# Patient Record
Sex: FEMALE | Race: WHITE | NOT HISPANIC OR LATINO | Employment: FULL TIME | URBAN - METROPOLITAN AREA
[De-identification: names, ages, dates, MRNs, and addresses within clinical notes are randomized per-mention and may not be internally consistent; named-entity substitution may affect disease eponyms.]

---

## 2023-11-22 ENCOUNTER — TELEPHONE (OUTPATIENT)
Dept: INTERNAL MEDICINE | Facility: CLINIC | Age: 58
End: 2023-11-22
Payer: COMMERCIAL

## 2023-11-22 NOTE — TELEPHONE ENCOUNTER
----- Message from Tyra Jacques LPN sent at 11/21/2023  3:47 PM CST -----  Regarding: ADELAIDE Pacheco 11/30/23 @1:00p  Are there any outstanding tasks in the chart? New patient     Is there any documentation of tasks? no    Has patient seen another physician, been to the ER, U[facility], or admitted to hospital since last visit?    Has the patient done blood work or imaging since last visit?

## 2024-09-16 ENCOUNTER — TELEPHONE (OUTPATIENT)
Dept: INTERNAL MEDICINE | Facility: CLINIC | Age: 59
End: 2024-09-16
Payer: COMMERCIAL

## 2024-09-16 NOTE — TELEPHONE ENCOUNTER
----- Message from Scar Vogt sent at 9/16/2024 11:28 AM CDT -----  .Type:  Patient Returning Call    Who Called:pt   Who Left Message for Patient:  Does the patient know what this is regarding?:needs a medical release form to be signed. Can one be sent to the pt? Email [email or web address]   Would the patient rather a call back or a response via [facility]? Call back   Best Call Back Number:[phone]  Additional Information:

## 2024-09-23 ENCOUNTER — TELEPHONE (OUTPATIENT)
Dept: INTERNAL MEDICINE | Facility: CLINIC | Age: 59
End: 2024-09-23

## 2024-09-23 ENCOUNTER — LAB VISIT (OUTPATIENT)
Dept: LAB | Facility: HOSPITAL | Age: 59
End: 2024-09-23
Attending: INTERNAL MEDICINE
Payer: COMMERCIAL

## 2024-09-23 ENCOUNTER — OFFICE VISIT (OUTPATIENT)
Dept: INTERNAL MEDICINE | Facility: CLINIC | Age: 59
End: 2024-09-23
Payer: COMMERCIAL

## 2024-09-23 VITALS
HEART RATE: 82 BPM | DIASTOLIC BLOOD PRESSURE: 62 MMHG | OXYGEN SATURATION: 98 % | HEIGHT: 62 IN | SYSTOLIC BLOOD PRESSURE: 94 MMHG | BODY MASS INDEX: 20.24 KG/M2 | WEIGHT: 110 LBS

## 2024-09-23 DIAGNOSIS — E03.9 ACQUIRED HYPOTHYROIDISM: ICD-10-CM

## 2024-09-23 DIAGNOSIS — Z76.89 ESTABLISHING CARE WITH NEW DOCTOR, ENCOUNTER FOR: Primary | ICD-10-CM

## 2024-09-23 DIAGNOSIS — Z12.11 SCREEN FOR COLON CANCER: ICD-10-CM

## 2024-09-23 DIAGNOSIS — Z12.31 BREAST CANCER SCREENING BY MAMMOGRAM: ICD-10-CM

## 2024-09-23 DIAGNOSIS — I10 PRIMARY HYPERTENSION: ICD-10-CM

## 2024-09-23 DIAGNOSIS — E34.9 HORMONE IMBALANCE: ICD-10-CM

## 2024-09-23 DIAGNOSIS — F32.A DEPRESSION, UNSPECIFIED DEPRESSION TYPE: ICD-10-CM

## 2024-09-23 LAB
T3FREE SERPL-MCNC: 2.85 PG/ML (ref 1.58–3.91)
T4 FREE SERPL-MCNC: 0.92 NG/DL (ref 0.7–1.48)
TSH SERPL-ACNC: 3.26 UIU/ML (ref 0.35–4.94)

## 2024-09-23 PROCEDURE — 3008F BODY MASS INDEX DOCD: CPT | Mod: CPTII,,, | Performed by: INTERNAL MEDICINE

## 2024-09-23 PROCEDURE — 84439 ASSAY OF FREE THYROXINE: CPT

## 2024-09-23 PROCEDURE — 3078F DIAST BP <80 MM HG: CPT | Mod: CPTII,,, | Performed by: INTERNAL MEDICINE

## 2024-09-23 PROCEDURE — 1159F MED LIST DOCD IN RCRD: CPT | Mod: CPTII,,, | Performed by: INTERNAL MEDICINE

## 2024-09-23 PROCEDURE — 84481 FREE ASSAY (FT-3): CPT

## 2024-09-23 PROCEDURE — 1160F RVW MEDS BY RX/DR IN RCRD: CPT | Mod: CPTII,,, | Performed by: INTERNAL MEDICINE

## 2024-09-23 PROCEDURE — 36415 COLL VENOUS BLD VENIPUNCTURE: CPT

## 2024-09-23 PROCEDURE — 4010F ACE/ARB THERAPY RXD/TAKEN: CPT | Mod: CPTII,,, | Performed by: INTERNAL MEDICINE

## 2024-09-23 PROCEDURE — 99204 OFFICE O/P NEW MOD 45 MIN: CPT | Mod: ,,, | Performed by: INTERNAL MEDICINE

## 2024-09-23 PROCEDURE — 84443 ASSAY THYROID STIM HORMONE: CPT

## 2024-09-23 PROCEDURE — 3074F SYST BP LT 130 MM HG: CPT | Mod: CPTII,,, | Performed by: INTERNAL MEDICINE

## 2024-09-23 RX ORDER — LEVOTHYROXINE SODIUM 50 UG/1
50 TABLET ORAL EVERY MORNING
COMMUNITY
End: 2024-09-23 | Stop reason: SDUPTHER

## 2024-09-23 RX ORDER — LEVOTHYROXINE SODIUM 50 UG/1
50 TABLET ORAL EVERY MORNING
Qty: 30 TABLET | Refills: 5 | Status: SHIPPED | OUTPATIENT
Start: 2024-09-23

## 2024-09-23 RX ORDER — CHOLECALCIFEROL (VITAMIN D3) 25 MCG
1000 TABLET ORAL DAILY
COMMUNITY

## 2024-09-23 RX ORDER — FLUOXETINE 20 MG/1
20 TABLET ORAL DAILY
Qty: 90 TABLET | Refills: 5 | Status: SHIPPED | OUTPATIENT
Start: 2024-09-23

## 2024-09-23 RX ORDER — ENALAPRIL MALEATE 5 MG/1
5 TABLET ORAL DAILY
COMMUNITY
End: 2024-09-23 | Stop reason: SDUPTHER

## 2024-09-23 RX ORDER — FLUOXETINE 20 MG/1
20 TABLET ORAL DAILY
COMMUNITY
Start: 2024-09-05 | End: 2024-09-23 | Stop reason: SDUPTHER

## 2024-09-23 RX ORDER — AMLODIPINE BESYLATE 2.5 MG/1
2.5 TABLET ORAL DAILY
Qty: 30 TABLET | Refills: 5 | Status: SHIPPED | OUTPATIENT
Start: 2024-09-23

## 2024-09-23 RX ORDER — VITAMIN B COMPLEX
CAPSULE ORAL DAILY
COMMUNITY

## 2024-09-23 RX ORDER — ENALAPRIL MALEATE 5 MG/1
5 TABLET ORAL DAILY
Qty: 90 TABLET | Refills: 5 | Status: SHIPPED | OUTPATIENT
Start: 2024-09-23

## 2024-09-23 RX ORDER — LIOTHYRONINE SODIUM 5 UG/1
5 TABLET ORAL DAILY
Qty: 30 TABLET | Refills: 5 | Status: SHIPPED | OUTPATIENT
Start: 2024-09-23

## 2024-09-23 RX ORDER — AMLODIPINE BESYLATE 2.5 MG/1
2.5 TABLET ORAL DAILY
COMMUNITY
End: 2024-09-23 | Stop reason: SDUPTHER

## 2024-09-23 RX ORDER — LIOTHYRONINE SODIUM 5 UG/1
5 TABLET ORAL DAILY
COMMUNITY
Start: 2024-02-28 | End: 2024-09-23 | Stop reason: SDUPTHER

## 2024-09-23 NOTE — ASSESSMENT & PLAN NOTE
Continue levothyroxine 50 mcg p.o. daily and liothyronine 5 mcg p.o. daily  Take medicine on an empty stomach with water (no other medications or beverages). Wait 30 minutes to eat or drink.  Report any symptoms of thinning hair, breaking nails, fatigue, weight gain or loss, palpitations.

## 2024-09-23 NOTE — PROGRESS NOTES
Subjective:      Patient ID: Erica Marc is a 58 y.o. female.    Chief Complaint: Establish Care    Erica is a 58-year-old female who is here today to establish care.  Her medical comorbidities are significant for hypertension, depression, hypothyroidism which seemed to be all currently well controlled.    TSH and free T3 and free T4 will be ordered.  Dose will be titrated if needed.    Only complaint today is some hair fall.  Discussed OTC minoxidil for local application    CRS: Due, refer to Gastro   Gyn: Refer to Dr. Limon    The patient's Health Maintenance was reviewed and the following appears to be due at this time:   Health Maintenance Due   Topic Date Due    Hepatitis C Screening  Never done    Cervical Cancer Screening  Never done    Lipid Panel  Never done    HIV Screening  Never done    TETANUS VACCINE  Never done    Colorectal Cancer Screening  Never done    Shingles Vaccine (1 of 2) Never done    Mammogram  09/15/2023    Influenza Vaccine (1) 2024    COVID-19 Vaccine ( season) Never done        Past Medical History:  Past Medical History:   Diagnosis Date    Depression     HTN (hypertension)     Hypothyroidism, unspecified      Past Surgical History:   Procedure Laterality Date     SECTION      3    TONSILLECTOMY      TUBAL LIGATION       Review of patient's allergies indicates:  No Known Allergies  Social History     Socioeconomic History    Marital status: Significant Other   Tobacco Use    Smoking status: Never    Smokeless tobacco: Never   Substance and Sexual Activity    Alcohol use: Never    Drug use: Never    Sexual activity: Yes     Partners: Male     Birth control/protection: See Surgical Hx, Other-see comments     Family History   Problem Relation Name Age of Onset    Arthritis Mother Mom     Hypertension Mother Mom     Cancer Father Dad     Depression Father Dad     Depression Brother Barry     Depression Sister Mara        Review of Systems    A comprehensive review  "of systems was performed and is negative except for that stated above  Objective:   BP 94/62 (BP Location: Left arm, Patient Position: Sitting, BP Method: Small (Manual))   Pulse 82   Ht 5' 2" (1.575 m)   Wt 49.9 kg (110 lb)   SpO2 98%   BMI 20.12 kg/m²     Physical Exam  Constitutional:       Appearance: Normal appearance.   HENT:      Head: Normocephalic and atraumatic.      Nose: Nose normal.      Mouth/Throat:      Mouth: Mucous membranes are moist.      Pharynx: Oropharynx is clear.   Eyes:      Extraocular Movements: Extraocular movements intact.      Pupils: Pupils are equal, round, and reactive to light.   Cardiovascular:      Rate and Rhythm: Normal rate and regular rhythm.      Pulses: Normal pulses.   Pulmonary:      Effort: Pulmonary effort is normal.      Breath sounds: Normal breath sounds.   Abdominal:      General: Bowel sounds are normal.      Palpations: Abdomen is soft.   Musculoskeletal:         General: Normal range of motion.      Cervical back: Normal range of motion and neck supple.   Skin:     General: Skin is warm.   Neurological:      General: No focal deficit present.      Mental Status: She is alert and oriented to person, place, and time. Mental status is at baseline.   Psychiatric:         Mood and Affect: Mood normal.       Assessment/ Plan:   1. Establishing care with new doctor, encounter for  Overview:  -labs are ordered for TSH, free T3 and free T4   -patient is advised on importance of watching her carbohydrate intake and saturated fat intake, making the right nutritional choices and exercising on a regular basis  -needs colonoscopy and Pap smear, sending a referral to Gastroenterology and Gynecology   -also ordering mammogram      2. Acquired hypothyroidism  Assessment & Plan:    Continue levothyroxine 50 mcg p.o. daily and liothyronine 5 mcg p.o. daily  Take medicine on an empty stomach with water (no other medications or beverages). Wait 30 minutes to eat or " drink.  Report any symptoms of thinning hair, breaking nails, fatigue, weight gain or loss, palpitations.      Orders:  -     TSH; Future; Expected date: 09/23/2024  -     T4, Free; Future; Expected date: 09/23/2024  -     T3, Free ([facility]); Future; Expected date: 09/23/2024    3. Primary hypertension  Overview:  Well controlled.   Continue enalapril 5 mg p.o. daily and Norvasc 2.5 mg p.o. daily  Low Sodium Diet (DASH Diet - Less than 2 grams of sodium per day).  Monitor blood pressure daily and log. Report consistent numbers greater than 140/90.  Maintain healthy weight with goal BMI <30. Exercise 30 minutes per day, 5 days per week.             4. Depression, unspecified depression type  Overview:  Continue Prozac 20 mg p.o. daily, staying controlled    Exercise daily. Get sunlight daily.  Practice positive phrases and repeat throughout the day, along with yoga and relaxation techniques.  Establish good social support, make changes to reduce stress.      5. Screen for colon cancer  -     Ambulatory referral/consult to Gastroenterology; Future; Expected date: 09/23/2024    6. Hormone imbalance  -     Ambulatory referral/consult to Obstetrics / Gynecology; Future; Expected date: 09/23/2024    7. Breast cancer screening by mammogram  -     Mammo Digital Screening Bilat; Future; Expected date: 09/23/2024    Other orders  -     FLUoxetine 20 MG tablet; Take 1 tablet (20 mg total) by mouth once daily.  Dispense: 90 tablet; Refill: 5  -     liothyronine (CYTOMEL) 5 MCG Tab; Take 1 tablet (5 mcg total) by mouth once daily.  Dispense: 30 tablet; Refill: 5  -     levothyroxine (SYNTHROID) 50 MCG tablet; Take 1 tablet (50 mcg total) by mouth every morning.  Dispense: 30 tablet; Refill: 5  -     enalapril (VASOTEC) 5 MG tablet; Take 1 tablet (5 mg total) by mouth once daily.  Dispense: 90 tablet; Refill: 5  -     amLODIPine (NORVASC) 2.5 MG tablet; Take 1 tablet (2.5 mg total) by mouth once daily.  Dispense: 30 tablet; Refill:  5

## 2024-09-23 NOTE — TELEPHONE ENCOUNTER
----- Message from Mame Gordon MD sent at 9/23/2024  3:23 PM CDT -----  Please contact the patient and let them know that their results were fine and do not require any change in treatment.

## 2024-09-24 ENCOUNTER — TELEPHONE (OUTPATIENT)
Dept: INTERNAL MEDICINE | Facility: CLINIC | Age: 59
End: 2024-09-24
Payer: COMMERCIAL

## 2024-09-24 NOTE — TELEPHONE ENCOUNTER
----- Message from [facility] sent at 9/24/2024 11:45 AM CDT -----  .Caller is requesting to schedule their Lab appointment prior to annual appointment.    Name of Caller: pt    Preferred Date and Time of Labs: now    Date of EPP Appointment: dr's request    Where would they like the lab performed? [facility]      Would the patient rather a call back or a response via [facility]?  lea    Best Call Back Number: [phone]      Additional Information: pt wants to know when can she go and do the labs that Dr. SCHWARTZ requested her to do please advise thanks also put labs in the system

## 2024-09-24 NOTE — TELEPHONE ENCOUNTER
Spoke with patient and let her know labs will be ordered before her wellness in March 25. She can get them done a week before.

## 2024-09-27 ENCOUNTER — HOSPITAL ENCOUNTER (OUTPATIENT)
Dept: RADIOLOGY | Facility: HOSPITAL | Age: 59
Discharge: HOME | End: 2024-09-27
Attending: INTERNAL MEDICINE
Payer: COMMERCIAL

## 2024-09-27 DIAGNOSIS — Z12.31 BREAST CANCER SCREENING BY MAMMOGRAM: ICD-10-CM

## 2024-09-27 PROCEDURE — 77063 BREAST TOMOSYNTHESIS BI: CPT | Mod: 26,,, | Performed by: RADIOLOGY

## 2024-09-27 PROCEDURE — 77067 SCR MAMMO BI INCL CAD: CPT | Mod: TC

## 2024-09-27 PROCEDURE — 77067 SCR MAMMO BI INCL CAD: CPT | Mod: 26,,, | Performed by: RADIOLOGY

## 2024-10-08 ENCOUNTER — TELEPHONE (OUTPATIENT)
Dept: INTERNAL MEDICINE | Facility: CLINIC | Age: 59
End: 2024-10-08
Payer: COMMERCIAL

## 2024-10-08 DIAGNOSIS — I10 HYPERTENSION, UNSPECIFIED TYPE: ICD-10-CM

## 2024-10-08 DIAGNOSIS — F32.A DEPRESSION, UNSPECIFIED DEPRESSION TYPE: Primary | ICD-10-CM

## 2024-10-08 DIAGNOSIS — E03.9 ACQUIRED HYPOTHYROIDISM: ICD-10-CM

## 2024-10-08 NOTE — TELEPHONE ENCOUNTER
----- Message from Jordan sent at 10/8/2024 11:14 AM CDT -----  .Who Called: Erica Marc    What order is the patient requesting: labs    When does the expect the orders to be performed?         Preferred Method of Contact: Phone Call  Patient's Preferred Phone Number on File: [phone]   Best Call Back Number, if different:  Additional Information:  pt called wanting to speak with nurse about lab orders requested by her insurance that needs to be done as soon as possible

## 2024-10-08 NOTE — TELEPHONE ENCOUNTER
Pt is requesting a CBC, CMP, and Lipid panel per her insurance established care 09/24/2024 upcoming appt on 03/24/2025. Please advise

## 2024-10-08 NOTE — TELEPHONE ENCOUNTER
Okay to add fasting CBC, CMP and lipid profile.  Diagnosis: hypertension, hypothyroidism, and depression

## 2024-10-09 ENCOUNTER — LAB VISIT (OUTPATIENT)
Dept: LAB | Facility: HOSPITAL | Age: 59
End: 2024-10-09
Attending: INTERNAL MEDICINE
Payer: COMMERCIAL

## 2024-10-09 DIAGNOSIS — F32.A DEPRESSION, UNSPECIFIED DEPRESSION TYPE: ICD-10-CM

## 2024-10-09 DIAGNOSIS — E03.9 ACQUIRED HYPOTHYROIDISM: ICD-10-CM

## 2024-10-09 DIAGNOSIS — I10 HYPERTENSION, UNSPECIFIED TYPE: ICD-10-CM

## 2024-10-09 LAB
ALBUMIN SERPL-MCNC: 3.9 G/DL (ref 3.5–5)
ALBUMIN/GLOB SERPL: 2.1 RATIO (ref 1.1–2)
ALP SERPL-CCNC: 58 UNIT/L (ref 40–150)
ALT SERPL-CCNC: 23 UNIT/L (ref 0–55)
ANION GAP SERPL CALC-SCNC: 10 MEQ/L
AST SERPL-CCNC: 18 UNIT/L (ref 5–34)
BASOPHILS # BLD AUTO: 0.04 X10(3)/MCL
BASOPHILS NFR BLD AUTO: 0.7 %
BILIRUB SERPL-MCNC: 0.5 MG/DL
BUN SERPL-MCNC: 27.2 MG/DL (ref 9.8–20.1)
CALCIUM SERPL-MCNC: 9.1 MG/DL (ref 8.4–10.2)
CHLORIDE SERPL-SCNC: 110 MMOL/L (ref 98–107)
CHOLEST SERPL-MCNC: 199 MG/DL
CHOLEST/HDLC SERPL: 3 {RATIO} (ref 0–5)
CO2 SERPL-SCNC: 24 MMOL/L (ref 22–29)
CREAT SERPL-MCNC: 0.81 MG/DL (ref 0.55–1.02)
CREAT/UREA NIT SERPL: 34
EOSINOPHIL # BLD AUTO: 0.1 X10(3)/MCL (ref 0–0.9)
EOSINOPHIL NFR BLD AUTO: 1.7 %
ERYTHROCYTE [DISTWIDTH] IN BLOOD BY AUTOMATED COUNT: 14.4 % (ref 11.5–17)
GFR SERPLBLD CREATININE-BSD FMLA CKD-EPI: >60 ML/MIN/1.73/M2
GLOBULIN SER-MCNC: 1.9 GM/DL (ref 2.4–3.5)
GLUCOSE SERPL-MCNC: 85 MG/DL (ref 74–100)
HCT VFR BLD AUTO: 37.4 % (ref 37–47)
HDLC SERPL-MCNC: 64 MG/DL (ref 35–60)
HGB BLD-MCNC: 12.3 G/DL (ref 12–16)
IMM GRANULOCYTES # BLD AUTO: 0.03 X10(3)/MCL (ref 0–0.04)
IMM GRANULOCYTES NFR BLD AUTO: 0.5 %
LDLC SERPL CALC-MCNC: 121 MG/DL (ref 50–140)
LYMPHOCYTES # BLD AUTO: 2.02 X10(3)/MCL (ref 0.6–4.6)
LYMPHOCYTES NFR BLD AUTO: 34.9 %
MCH RBC QN AUTO: 29.7 PG (ref 27–31)
MCHC RBC AUTO-ENTMCNC: 32.9 G/DL (ref 33–36)
MCV RBC AUTO: 90.3 FL (ref 80–94)
MONOCYTES # BLD AUTO: 0.47 X10(3)/MCL (ref 0.1–1.3)
MONOCYTES NFR BLD AUTO: 8.1 %
NEUTROPHILS # BLD AUTO: 3.13 X10(3)/MCL (ref 2.1–9.2)
NEUTROPHILS NFR BLD AUTO: 54.1 %
NRBC BLD AUTO-RTO: 0 %
PLATELET # BLD AUTO: 275 X10(3)/MCL (ref 130–400)
PMV BLD AUTO: 9.7 FL (ref 7.4–10.4)
POTASSIUM SERPL-SCNC: 4.1 MMOL/L (ref 3.5–5.1)
PROT SERPL-MCNC: 5.8 GM/DL (ref 6.4–8.3)
RBC # BLD AUTO: 4.14 X10(6)/MCL (ref 4.2–5.4)
SODIUM SERPL-SCNC: 144 MMOL/L (ref 136–145)
TRIGL SERPL-MCNC: 71 MG/DL (ref 37–140)
VLDLC SERPL CALC-MCNC: 14 MG/DL
WBC # BLD AUTO: 5.79 X10(3)/MCL (ref 4.5–11.5)

## 2024-10-09 PROCEDURE — 85025 COMPLETE CBC W/AUTO DIFF WBC: CPT

## 2024-10-09 PROCEDURE — 36415 COLL VENOUS BLD VENIPUNCTURE: CPT

## 2024-10-09 PROCEDURE — 80061 LIPID PANEL: CPT

## 2024-10-09 PROCEDURE — 80053 COMPREHEN METABOLIC PANEL: CPT

## 2024-12-17 DIAGNOSIS — I10 HYPERTENSION, UNSPECIFIED TYPE: Primary | ICD-10-CM

## 2024-12-18 RX ORDER — ENALAPRIL MALEATE 5 MG/1
5 TABLET ORAL DAILY
Qty: 90 TABLET | Refills: 5 | Status: SHIPPED | OUTPATIENT
Start: 2024-12-18

## 2025-01-06 ENCOUNTER — PATIENT OUTREACH (OUTPATIENT)
Facility: CLINIC | Age: 60
End: 2025-01-06
Payer: COMMERCIAL

## 2025-01-06 LAB — CRC RECOMMENDATION EXT: NORMAL

## 2025-01-06 NOTE — PROGRESS NOTES
Health Maintenance Topic(s) Outreach Outcomes & Actions Taken:    Colorectal Cancer Screening - Outreach Outcomes & Actions Taken  : External Records Uploaded, Care Team Updated, & History Updated if Applicable     Additional Notes:  Colonoscopy 1/6/25

## 2025-03-12 DIAGNOSIS — Z00.00 WELLNESS EXAMINATION: ICD-10-CM

## 2025-03-12 DIAGNOSIS — I10 HYPERTENSION, UNSPECIFIED TYPE: ICD-10-CM

## 2025-03-12 DIAGNOSIS — E03.9 ACQUIRED HYPOTHYROIDISM: ICD-10-CM

## 2025-03-12 DIAGNOSIS — E34.9 HORMONE IMBALANCE: ICD-10-CM

## 2025-03-12 DIAGNOSIS — F32.A DEPRESSION, UNSPECIFIED DEPRESSION TYPE: Primary | ICD-10-CM

## 2025-03-12 DIAGNOSIS — E56.9 VITAMIN DEFICIENCY: ICD-10-CM

## 2025-03-17 ENCOUNTER — TELEPHONE (OUTPATIENT)
Dept: INTERNAL MEDICINE | Facility: CLINIC | Age: 60
End: 2025-03-17
Payer: COMMERCIAL

## 2025-03-17 NOTE — TELEPHONE ENCOUNTER
----- Message from Med Assistant Andrews sent at 3/12/2025  8:05 AM CDT -----  Regarding: PV Monday 3-24-25  Wellness AppointmentFasting wellness labs ordered and ready to do. NP 9-23-24

## 2025-03-20 ENCOUNTER — LAB VISIT (OUTPATIENT)
Dept: LAB | Facility: HOSPITAL | Age: 60
End: 2025-03-20
Attending: INTERNAL MEDICINE
Payer: MEDICAID

## 2025-03-20 DIAGNOSIS — I10 HYPERTENSION, UNSPECIFIED TYPE: ICD-10-CM

## 2025-03-20 DIAGNOSIS — E03.9 ACQUIRED HYPOTHYROIDISM: ICD-10-CM

## 2025-03-20 DIAGNOSIS — F32.A DEPRESSION, UNSPECIFIED DEPRESSION TYPE: ICD-10-CM

## 2025-03-20 DIAGNOSIS — E34.9 HORMONE IMBALANCE: ICD-10-CM

## 2025-03-20 DIAGNOSIS — E56.9 VITAMIN DEFICIENCY: ICD-10-CM

## 2025-03-20 DIAGNOSIS — Z00.00 WELLNESS EXAMINATION: ICD-10-CM

## 2025-03-20 LAB
25(OH)D3+25(OH)D2 SERPL-MCNC: 110 NG/ML (ref 30–80)
ALBUMIN SERPL-MCNC: 4 G/DL (ref 3.5–5)
ALBUMIN/GLOB SERPL: 1.5 RATIO (ref 1.1–2)
ALP SERPL-CCNC: 62 UNIT/L (ref 40–150)
ALT SERPL-CCNC: 43 UNIT/L (ref 0–55)
ANION GAP SERPL CALC-SCNC: 6 MEQ/L
AST SERPL-CCNC: 29 UNIT/L (ref 5–34)
BASOPHILS # BLD AUTO: 0.04 X10(3)/MCL
BASOPHILS NFR BLD AUTO: 0.6 %
BILIRUB SERPL-MCNC: 0.5 MG/DL
BUN SERPL-MCNC: 25.4 MG/DL (ref 9.8–20.1)
CALCIUM SERPL-MCNC: 9.2 MG/DL (ref 8.4–10.2)
CHLORIDE SERPL-SCNC: 107 MMOL/L (ref 98–107)
CHOLEST SERPL-MCNC: 209 MG/DL
CHOLEST/HDLC SERPL: 3 {RATIO} (ref 0–5)
CO2 SERPL-SCNC: 25 MMOL/L (ref 22–29)
CREAT SERPL-MCNC: 0.83 MG/DL (ref 0.55–1.02)
CREAT/UREA NIT SERPL: 31
EOSINOPHIL # BLD AUTO: 0.13 X10(3)/MCL (ref 0–0.9)
EOSINOPHIL NFR BLD AUTO: 2.1 %
ERYTHROCYTE [DISTWIDTH] IN BLOOD BY AUTOMATED COUNT: 13.9 % (ref 11.5–17)
EST. AVERAGE GLUCOSE BLD GHB EST-MCNC: 105.4 MG/DL
GFR SERPLBLD CREATININE-BSD FMLA CKD-EPI: >60 ML/MIN/1.73/M2
GLOBULIN SER-MCNC: 2.7 GM/DL (ref 2.4–3.5)
GLUCOSE SERPL-MCNC: 92 MG/DL (ref 74–100)
HBA1C MFR BLD: 5.3 %
HCT VFR BLD AUTO: 40.9 % (ref 37–47)
HDLC SERPL-MCNC: 73 MG/DL (ref 35–60)
HGB BLD-MCNC: 13.7 G/DL (ref 12–16)
IMM GRANULOCYTES # BLD AUTO: 0.01 X10(3)/MCL (ref 0–0.04)
IMM GRANULOCYTES NFR BLD AUTO: 0.2 %
LDLC SERPL CALC-MCNC: 124 MG/DL (ref 50–140)
LYMPHOCYTES # BLD AUTO: 2.53 X10(3)/MCL (ref 0.6–4.6)
LYMPHOCYTES NFR BLD AUTO: 40.9 %
MCH RBC QN AUTO: 30.2 PG (ref 27–31)
MCHC RBC AUTO-ENTMCNC: 33.5 G/DL (ref 33–36)
MCV RBC AUTO: 90.1 FL (ref 80–94)
MONOCYTES # BLD AUTO: 0.53 X10(3)/MCL (ref 0.1–1.3)
MONOCYTES NFR BLD AUTO: 8.6 %
NEUTROPHILS # BLD AUTO: 2.95 X10(3)/MCL (ref 2.1–9.2)
NEUTROPHILS NFR BLD AUTO: 47.6 %
NRBC BLD AUTO-RTO: 0 %
PLATELET # BLD AUTO: 284 X10(3)/MCL (ref 130–400)
PMV BLD AUTO: 9.4 FL (ref 7.4–10.4)
POTASSIUM SERPL-SCNC: 4.2 MMOL/L (ref 3.5–5.1)
PROT SERPL-MCNC: 6.7 GM/DL (ref 6.4–8.3)
RBC # BLD AUTO: 4.54 X10(6)/MCL (ref 4.2–5.4)
SODIUM SERPL-SCNC: 138 MMOL/L (ref 136–145)
TRIGL SERPL-MCNC: 62 MG/DL (ref 37–140)
TSH SERPL-ACNC: 1.23 UIU/ML (ref 0.35–4.94)
VLDLC SERPL CALC-MCNC: 12 MG/DL
WBC # BLD AUTO: 6.19 X10(3)/MCL (ref 4.5–11.5)

## 2025-03-20 PROCEDURE — 80053 COMPREHEN METABOLIC PANEL: CPT

## 2025-03-20 PROCEDURE — 84443 ASSAY THYROID STIM HORMONE: CPT

## 2025-03-20 PROCEDURE — 82306 VITAMIN D 25 HYDROXY: CPT

## 2025-03-20 PROCEDURE — 83036 HEMOGLOBIN GLYCOSYLATED A1C: CPT

## 2025-03-20 PROCEDURE — 36415 COLL VENOUS BLD VENIPUNCTURE: CPT

## 2025-03-20 PROCEDURE — 85025 COMPLETE CBC W/AUTO DIFF WBC: CPT

## 2025-03-20 PROCEDURE — 80061 LIPID PANEL: CPT

## 2025-03-21 DIAGNOSIS — F32.A DEPRESSION, UNSPECIFIED DEPRESSION TYPE: Primary | ICD-10-CM

## 2025-03-21 RX ORDER — FLUOXETINE HYDROCHLORIDE 20 MG/1
20 CAPSULE ORAL DAILY
Qty: 90 CAPSULE | Refills: 1 | Status: SHIPPED | OUTPATIENT
Start: 2025-03-21 | End: 2026-03-21

## 2025-03-24 ENCOUNTER — TELEPHONE (OUTPATIENT)
Dept: INTERNAL MEDICINE | Facility: CLINIC | Age: 60
End: 2025-03-24

## 2025-03-24 ENCOUNTER — OFFICE VISIT (OUTPATIENT)
Dept: INTERNAL MEDICINE | Facility: CLINIC | Age: 60
End: 2025-03-24
Payer: MEDICAID

## 2025-03-24 VITALS
OXYGEN SATURATION: 99 % | HEIGHT: 62 IN | BODY MASS INDEX: 20.61 KG/M2 | SYSTOLIC BLOOD PRESSURE: 94 MMHG | DIASTOLIC BLOOD PRESSURE: 66 MMHG | WEIGHT: 112 LBS | HEART RATE: 83 BPM

## 2025-03-24 DIAGNOSIS — D22.9 CHANGE IN SKIN MOLE: ICD-10-CM

## 2025-03-24 DIAGNOSIS — G47.00 INSOMNIA, UNSPECIFIED TYPE: ICD-10-CM

## 2025-03-24 DIAGNOSIS — F32.A DEPRESSION, UNSPECIFIED DEPRESSION TYPE: ICD-10-CM

## 2025-03-24 DIAGNOSIS — E03.9 ACQUIRED HYPOTHYROIDISM: ICD-10-CM

## 2025-03-24 DIAGNOSIS — D22.9 CHANGE IN SKIN MOLE: Primary | ICD-10-CM

## 2025-03-24 DIAGNOSIS — Z00.00 WELL ADULT EXAM: Primary | ICD-10-CM

## 2025-03-24 DIAGNOSIS — I10 PRIMARY HYPERTENSION: ICD-10-CM

## 2025-03-24 DIAGNOSIS — Z12.4 SCREENING FOR CERVICAL CANCER: ICD-10-CM

## 2025-03-24 PROBLEM — Z76.89 ESTABLISHING CARE WITH NEW DOCTOR, ENCOUNTER FOR: Status: RESOLVED | Noted: 2024-09-23 | Resolved: 2025-03-24

## 2025-03-24 PROCEDURE — 3078F DIAST BP <80 MM HG: CPT | Mod: CPTII,,, | Performed by: INTERNAL MEDICINE

## 2025-03-24 PROCEDURE — 1159F MED LIST DOCD IN RCRD: CPT | Mod: CPTII,,, | Performed by: INTERNAL MEDICINE

## 2025-03-24 PROCEDURE — 3044F HG A1C LEVEL LT 7.0%: CPT | Mod: CPTII,,, | Performed by: INTERNAL MEDICINE

## 2025-03-24 PROCEDURE — 4010F ACE/ARB THERAPY RXD/TAKEN: CPT | Mod: CPTII,,, | Performed by: INTERNAL MEDICINE

## 2025-03-24 PROCEDURE — 3008F BODY MASS INDEX DOCD: CPT | Mod: CPTII,,, | Performed by: INTERNAL MEDICINE

## 2025-03-24 PROCEDURE — 99396 PREV VISIT EST AGE 40-64: CPT | Mod: ,,, | Performed by: INTERNAL MEDICINE

## 2025-03-24 PROCEDURE — 3074F SYST BP LT 130 MM HG: CPT | Mod: CPTII,,, | Performed by: INTERNAL MEDICINE

## 2025-03-24 PROCEDURE — 1160F RVW MEDS BY RX/DR IN RCRD: CPT | Mod: CPTII,,, | Performed by: INTERNAL MEDICINE

## 2025-03-24 RX ORDER — TRAZODONE HYDROCHLORIDE 50 MG/1
50 TABLET ORAL NIGHTLY PRN
Qty: 30 TABLET | Refills: 11 | Status: SHIPPED | OUTPATIENT
Start: 2025-03-24 | End: 2026-03-24

## 2025-03-24 RX ORDER — ACETAMINOPHEN AND PHENYLEPHRINE HCL 325; 5 MG/1; MG/1
TABLET ORAL DAILY
COMMUNITY

## 2025-03-24 RX ORDER — MAGNESIUM GLUCONATE 27.5 (500)
500 TABLET ORAL DAILY
COMMUNITY

## 2025-03-24 RX ORDER — ACYCLOVIR 800 MG/1
200 TABLET ORAL DAILY PRN
COMMUNITY

## 2025-03-24 RX ORDER — VITAMIN A 3000 MCG
3000 CAPSULE ORAL DAILY
COMMUNITY

## 2025-03-24 RX ORDER — ACYCLOVIR 200 MG/5ML
SUSPENSION ORAL
COMMUNITY
Start: 2000-02-21 | End: 2025-03-24

## 2025-03-24 NOTE — ASSESSMENT & PLAN NOTE
Trial trazodone - encouraged to try 25 mg nightly initially, may increase to 50 mg nightly as needed.  Sleep hygiene discussed

## 2025-03-24 NOTE — ASSESSMENT & PLAN NOTE
Lab Results   Component Value Date    TSH 1.232 03/20/2025      Continue levothyroxine 50 mcg daily and liothyronine 5 mcg daily  Take medicine on an empty stomach with water (no other medications or beverages). Wait 30 minutes to eat or drink.  Report any symptoms of thinning hair, breaking nails, fatigue, weight gain or loss, palpitations.

## 2025-03-24 NOTE — PROGRESS NOTES
Internal Medicine      Patient Name:  Erica Marc  Patient ID:  [id number]    Chief Complaint:  Annual Exam (Wellness/)      Erica Marc is a 59 y.o. female, known to Dr Gordon, is here today for annual wellness visit.  Medical comorbidities include hypertension, depression, hypothyroidism.    Wellness labs reviewed and generally stable.  Vitamin-D elevated at 110.  Encouraged to decrease vitamin-D supplement to 5 days weekly.  Screenings and immunizations reviewed and discussed with the patient.  She is due for repeat Pap, she will contact GYN to schedule.  Declines vaccinations today.  Overall, she is doing well.  She does have concerns of a mole on the left side of her face which has been present for the past year.  She reports recent changes, including change in shape, itching, and occasional sloughing.  She is requesting referral to Dermatology for further evaluation.    Patient was started by the nurse practitioner however I took over care during the visit.  Past Medical History:   Diagnosis Date    Depression     HTN (hypertension)     Hypothyroidism, unspecified         Past Surgical History:   Procedure Laterality Date     SECTION      3    COLONOSCOPY  2025    Dr. Martinez Pearson    HEMORRHOID SURGERY          TONSILLECTOMY      TUBAL LIGATION          Social History     Tobacco Use    Smoking status: Never    Smokeless tobacco: Never   Substance and Sexual Activity    Alcohol use: Never    Drug use: Never    Sexual activity: Yes     Partners: Male     Birth control/protection: See Surgical Hx, Other-see comments        Current Outpatient Medications   Medication Instructions    acyclovir (ZOVIRAX) 200 mg, Daily PRN    amLODIPine (NORVASC) 2.5 mg, Oral, Daily    b complex vitamins capsule Daily    biotin 10,000 mcg Cap Daily    enalapril (VASOTEC) 5 mg, Oral, Daily    FLAXSEED OIL MISC Daily    FLUoxetine 20 mg, Oral, Daily    levothyroxine (SYNTHROID) 50 mcg, Oral, Every morning     "liothyronine (CYTOMEL) 5 mcg, Oral, Daily    magnesium gluconate 27.5 mg magne- sium (500 mg) Tab 500 mg, Daily    traZODone (DESYREL) 50 mg, Oral, Nightly PRN    vitamin A 3,000 Units, Daily    vitamin D (VITAMIN D3) 1,000 Units, Daily       Review of patient's allergies indicates:  No Known Allergies     Patient Care Team:  Mame Gordon MD as PCP - General (Internal Medicine)       Subjective:    Review of Systems   Constitutional:  Negative for appetite change, chills, diaphoresis and fever.   HENT:  Negative for congestion, ear pain, sinus pain and sore throat.    Eyes:  Negative for pain and visual disturbance.   Respiratory:  Negative for cough, shortness of breath and wheezing.    Cardiovascular:  Negative for chest pain, palpitations and leg swelling.   Gastrointestinal:  Negative for abdominal pain, constipation, diarrhea, nausea and vomiting.   Endocrine: Negative for cold intolerance.   Genitourinary:  Negative for difficulty urinating, dysuria, frequency and hematuria.   Musculoskeletal:  Negative for arthralgias and myalgias.   Skin:  Positive for color change (mole on left side of face). Negative for rash.   Allergic/Immunologic: Negative.    Neurological: Negative.  Negative for dizziness, syncope, light-headedness and numbness.   Hematological: Negative.    Psychiatric/Behavioral: Negative.  Negative for dysphoric mood. The patient is not nervous/anxious.    All other systems reviewed and are negative.      Objective:    Visit Vitals  BP 94/66 (BP Location: Left arm, Patient Position: Sitting)   Pulse 83   Ht 5' 2" (1.575 m)   Wt 50.8 kg (112 lb)   SpO2 99%   BMI 20.49 kg/m²       Physical Exam  Vitals and nursing note reviewed.   Constitutional:       General: She is not in acute distress.     Appearance: She is not ill-appearing.   HENT:      Head: Normocephalic and atraumatic.      Mouth/Throat:      Mouth: Mucous membranes are moist.      Pharynx: Oropharynx is clear.   Eyes:      " General: No scleral icterus.     Extraocular Movements: Extraocular movements intact.      Conjunctiva/sclera: Conjunctivae normal.      Pupils: Pupils are equal, round, and reactive to light.   Neck:      Vascular: No carotid bruit.   Cardiovascular:      Rate and Rhythm: Normal rate and regular rhythm.      Heart sounds: Normal heart sounds. No murmur heard.     No friction rub. No gallop.   Pulmonary:      Effort: Pulmonary effort is normal. No respiratory distress.      Breath sounds: Normal breath sounds. No wheezing, rhonchi or rales.   Abdominal:      General: Abdomen is flat. Bowel sounds are normal. There is no distension.      Palpations: Abdomen is soft. There is no mass.      Tenderness: There is no abdominal tenderness.   Musculoskeletal:         General: Normal range of motion.      Cervical back: Normal range of motion and neck supple.   Skin:     General: Skin is warm and dry.      Capillary Refill: Capillary refill takes less than 2 seconds.      Comments: Left cheek with irregular shaped mole   Neurological:      General: No focal deficit present.      Mental Status: She is alert and oriented to person, place, and time.   Psychiatric:         Mood and Affect: Mood normal.         Behavior: Behavior normal.       Labs Reviewed:    Chemistry:  Lab Results   Component Value Date     03/20/2025    K 4.2 03/20/2025    BUN 25.4 (H) 03/20/2025    CREATININE 0.83 03/20/2025    EGFRNORACEVR >60 03/20/2025    GLUCOSE 92 03/20/2025    CALCIUM 9.2 03/20/2025    ALKPHOS 62 03/20/2025    LABPROT 6.7 03/20/2025    ALBUMIN 4.0 03/20/2025    AST 29 03/20/2025    ALT 43 03/20/2025    PRAWCOEB65IP 110 (H) 03/20/2025    TSH 1.232 03/20/2025    EBXSID8AQLF 0.92 09/23/2024        Lab Results   Component Value Date    HGBA1C 5.3 03/20/2025        Hematology:  Lab Results   Component Value Date    WBC 6.19 03/20/2025    HGB 13.7 03/20/2025    HCT 40.9 03/20/2025     03/20/2025       Lipid Panel:  Lab Results    Component Value Date    CHOL 209 (H) 03/20/2025    HDL 73 (H) 03/20/2025    .00 03/20/2025    TRIG 62 03/20/2025    TOTALCHOLEST 3 03/20/2025        Assessment:      ICD-10-CM ICD-9-CM   1. Well adult exam  Z00.00 V70.0   2. Change in skin mole  D22.9 216.9   3. Insomnia, unspecified type  G47.00 780.52   4. Depression, unspecified depression type  F32.A 311   5. Primary hypertension  I10 401.9   6. Acquired hypothyroidism  E03.9 244.9        Plan:    1. Well adult exam  Overview:  -labs are ordered for TSH, free T3 and free T4   -patient is advised on importance of watching her carbohydrate intake and saturated fat intake, making the right nutritional choices and exercising on a regular basis  -needs colonoscopy and Pap smear, sending a referral to Gastroenterology and Gynecology   -also ordering mammogram    Assessment & Plan:  Patient feeling generally well today  wellness labs reviewed and generally stable  Age-appropriate screenings and Immunizations reviewed and discussed.  She will contact GYN to schedule Pap.  Declines vaccinations today.  Encourage routine aerobic exercise 2 to 3 times a week  Increase fluid hydration  Follow up in 6 months or sooner if needed       2. Change in skin mole  Assessment & Plan:  Requesting referral to Dermatology    Orders:  -     Ambulatory referral/consult to Dermatology; Future; Expected date: 03/31/2025    3. Insomnia, unspecified type  Assessment & Plan:  Trial trazodone - encouraged to try 25 mg nightly initially, may increase to 50 mg nightly as needed.  Sleep hygiene discussed    Orders:  -     traZODone (DESYREL) 50 MG tablet; Take 1 tablet (50 mg total) by mouth nightly as needed for Insomnia.  Dispense: 30 tablet; Refill: 11    4. Depression, unspecified depression type  Overview:  Continue Prozac 20 mg p.o. daily, staying controlled    Exercise daily. Get sunlight daily.  Practice positive phrases and repeat throughout the day, along with yoga and  relaxation techniques.  Establish good social support, make changes to reduce stress.    Assessment & Plan:  Stable   Continue fluoxetine 20 mg daily      5. Primary hypertension  Overview:  Well controlled.   Continue enalapril 5 mg p.o. daily and Norvasc 2.5 mg p.o. daily  Low Sodium Diet (DASH Diet - Less than 2 grams of sodium per day).  Monitor blood pressure daily and log. Report consistent numbers greater than 140/90.  Maintain healthy weight with goal BMI <30. Exercise 30 minutes per day, 5 days per week.           Assessment & Plan:  Well-controlled   Continue enalapril 5 mg daily and amlodipine 2.5 mg daily   Encouraged low-sodium diet   Continue to monitor blood pressure at home and report readings consistently above 140/90.      6. Acquired hypothyroidism  Assessment & Plan:  Lab Results   Component Value Date    TSH 1.232 03/20/2025      Continue levothyroxine 50 mcg daily and liothyronine 5 mcg daily  Take medicine on an empty stomach with water (no other medications or beverages). Wait 30 minutes to eat or drink.  Report any symptoms of thinning hair, breaking nails, fatigue, weight gain or loss, palpitations.             Follow up in about 6 months (around 9/24/2025) for routine follow up. In addition to their scheduled follow up, the patient has also been instructed to follow up on as needed basis.       Future Appointments   Date Time Provider Department Center   9/25/2025 10:00 AM Klever Parada FNP [facility] [place] [id number]          PIERCE Rm

## 2025-03-24 NOTE — ASSESSMENT & PLAN NOTE
Patient feeling generally well today  wellness labs reviewed and generally stable  Age-appropriate screenings and Immunizations reviewed and discussed.  She will contact GYN to schedule Pap.  Declines vaccinations today.  Encourage routine aerobic exercise 2 to 3 times a week  Increase fluid hydration  Follow up in 6 months or sooner if needed

## 2025-03-24 NOTE — ASSESSMENT & PLAN NOTE
Well-controlled   Continue enalapril 5 mg daily and amlodipine 2.5 mg daily   Encouraged low-sodium diet   Continue to monitor blood pressure at home and report readings consistently above 140/90.

## 2025-03-24 NOTE — TELEPHONE ENCOUNTER
----- Message from Julia Manzano sent at 3/24/2025 11:43 AM CDT -----  Who Called: Zulma abdul/ [facility] is requesting assistance/information from provider's office.Symptoms (please be specific): N/A How long has patient had these symptoms:  N/AList of preferred pharmacies on file (remove unneeded): [unfilled]If different, enter pharmacy into here including location and phone number: N/APreferred Method of Contact: Phone CallPatient's Preferred Phone Number on File: [phone] Best Call Back Number, if different:[phone] Information: Dermatologist office called to state the referral was denied, they don't accept the pt insurance.  ----- Message -----  From: Julia Manzano  Sent: 3/24/2025  11:45 AM CDT  To: Evan CONNELL Staff    Who Called: Tina is requesting assistance/information from provider's office.Symptoms (please be specific): N/A How long has patient had these symptoms:  N/AList of preferred pharmacies on file (remove unneeded): [unfilled]If different, enter pharmacy into here including location and phone number: N/APreferred Method of Contact: Phone CallPatient's Preferred Phone Number on File: [phone] Best Call Back Number, if different:[phone] Information: Dermatologist office called to state the referral was denied, they don't accept the pt insurance.

## 2025-03-31 DIAGNOSIS — I10 PRIMARY HYPERTENSION: Primary | ICD-10-CM

## 2025-03-31 RX ORDER — AMLODIPINE BESYLATE 2.5 MG/1
2.5 TABLET ORAL
Qty: 30 TABLET | Refills: 3 | Status: SHIPPED | OUTPATIENT
Start: 2025-03-31

## 2025-05-01 DIAGNOSIS — F32.A DEPRESSION, UNSPECIFIED DEPRESSION TYPE: ICD-10-CM

## 2025-05-01 RX ORDER — FLUOXETINE HYDROCHLORIDE 20 MG/1
20 CAPSULE ORAL DAILY
Qty: 90 CAPSULE | Refills: 1 | Status: SHIPPED | OUTPATIENT
Start: 2025-05-01 | End: 2026-05-01

## 2025-06-05 ENCOUNTER — OFFICE VISIT (OUTPATIENT)
Dept: INTERNAL MEDICINE | Facility: CLINIC | Age: 60
End: 2025-06-05
Payer: MEDICAID

## 2025-06-05 VITALS
TEMPERATURE: 98 F | HEIGHT: 62 IN | RESPIRATION RATE: 16 BRPM | DIASTOLIC BLOOD PRESSURE: 67 MMHG | WEIGHT: 111.81 LBS | BODY MASS INDEX: 20.58 KG/M2 | OXYGEN SATURATION: 100 % | SYSTOLIC BLOOD PRESSURE: 92 MMHG | HEART RATE: 69 BPM

## 2025-06-05 DIAGNOSIS — Z12.4 CERVICAL CANCER SCREENING: ICD-10-CM

## 2025-06-05 DIAGNOSIS — I10 PRIMARY HYPERTENSION: ICD-10-CM

## 2025-06-05 DIAGNOSIS — E03.9 ACQUIRED HYPOTHYROIDISM: ICD-10-CM

## 2025-06-05 DIAGNOSIS — L98.9 SKIN LESION: ICD-10-CM

## 2025-06-05 DIAGNOSIS — Z23 IMMUNIZATION DUE: ICD-10-CM

## 2025-06-05 DIAGNOSIS — Z11.4 SCREENING FOR HIV (HUMAN IMMUNODEFICIENCY VIRUS): ICD-10-CM

## 2025-06-05 DIAGNOSIS — R68.89 SUSPECTED VIRAL INFECTION OF SKIN: ICD-10-CM

## 2025-06-05 DIAGNOSIS — Z11.59 NEED FOR HEPATITIS C SCREENING TEST: ICD-10-CM

## 2025-06-05 DIAGNOSIS — Z76.89 ENCOUNTER TO ESTABLISH CARE: Primary | ICD-10-CM

## 2025-06-05 DIAGNOSIS — F32.A DEPRESSION, UNSPECIFIED DEPRESSION TYPE: ICD-10-CM

## 2025-06-05 PROBLEM — D22.9 CHANGE IN SKIN MOLE: Status: RESOLVED | Noted: 2025-03-24 | Resolved: 2025-06-05

## 2025-06-05 PROBLEM — Z00.00 WELL ADULT EXAM: Status: RESOLVED | Noted: 2024-09-23 | Resolved: 2025-06-05

## 2025-06-05 PROCEDURE — 99215 OFFICE O/P EST HI 40 MIN: CPT | Mod: PBBFAC

## 2025-06-05 RX ORDER — ACYCLOVIR 800 MG/1
800 TABLET ORAL DAILY PRN
Qty: 30 TABLET | Refills: 3 | Status: SHIPPED | OUTPATIENT
Start: 2025-06-05 | End: 2026-06-05

## 2025-06-10 ENCOUNTER — OFFICE VISIT (OUTPATIENT)
Dept: GYNECOLOGY | Facility: CLINIC | Age: 60
End: 2025-06-10
Payer: MEDICAID

## 2025-06-10 VITALS
OXYGEN SATURATION: 100 % | TEMPERATURE: 99 F | RESPIRATION RATE: 18 BRPM | HEIGHT: 62 IN | BODY MASS INDEX: 21.86 KG/M2 | SYSTOLIC BLOOD PRESSURE: 98 MMHG | DIASTOLIC BLOOD PRESSURE: 68 MMHG | HEART RATE: 75 BPM | WEIGHT: 118.81 LBS

## 2025-06-10 DIAGNOSIS — Z12.4 CERVICAL CANCER SCREENING: Primary | ICD-10-CM

## 2025-06-10 DIAGNOSIS — Z12.31 VISIT FOR SCREENING MAMMOGRAM: ICD-10-CM

## 2025-06-10 PROCEDURE — 88174 CYTOPATH C/V AUTO IN FLUID: CPT | Performed by: NURSE PRACTITIONER

## 2025-06-10 PROCEDURE — 99215 OFFICE O/P EST HI 40 MIN: CPT | Mod: PBBFAC | Performed by: NURSE PRACTITIONER

## 2025-06-10 NOTE — PROGRESS NOTES
"  [facility] -  Gynecology / Women's Health Clinic      Subjective:       Patient ID: Erica Marc is a 59 y.o. female.    Chief Complaint:  Gynecologic Exam    History of Present Illness  The patient  here for annual exam. Pt is postmenopausal since age 56. Denies any vaginal bleeding. [place]. Denies history of abnormal paps. MG- 10/2/24 & BIRADS 1. Denies breast or urinary complaints. Denies pelvic pain, abnormal bleeding or discharge. Pt reports no STIs in the past and no concerns. Denies tobacco use. Dep. screening 0. Denies fly hx of breast, ovarian, uterine or colon cancer. Colonoscopy in 2025, repeat in 2 years per pt, 5 years per report. No GYN complaints today.    GYN & OB History  No LMP recorded. Patient is postmenopausal.       Review of patient's allergies indicates:  No Known Allergies  Past Medical History:   Diagnosis Date    Depression     HTN (hypertension)     Hypothyroidism, unspecified      OB History    Para Term  AB Living   4 3 2 1 1 3   SAB IAB Ectopic Multiple Live Births   1    3      # Outcome Date GA Lbr Fredy/2nd Weight Sex Type Anes PTL Lv   4       CS-Unspec   SAWYER   3 Term      CS-Unspec   SAWYER   2 Term      CS-Unspec   SAWYER   1 SAB      SAB   FD        Review of Systems  Review of Systems    Negative except for pertinent findings for positives per HPI     Objective:    Physical Exam    BP 98/68   Pulse 75   Temp 98.5 °F (36.9 °C) (Oral)   Resp 18   Ht 5' 2" (1.575 m)   Wt 53.9 kg (118 lb 12.8 oz)   SpO2 100%   BMI 21.73 kg/m²   GENERAL: Well-developed female. No acute distress.    SKIN: Normal to inspection, warm and intact.  BREASTS: No rashes or erythema. No masses, lumps, discharge, tenderness.  VULVA: General appearance normal; external genitalia with no lesions or erythema.  VAGINA: Mucosa/vaginal vault pink, no abnormal discharge or lesions.  CERVIX: Pink, stenotic appearing os, no erythema or " abnormal discharge.  BIMANUAL EXAM: reveals a 8 week-sized uterus. The uterus is non tender. James adnexa reveal no tenderness.  PSYCHIATRIC: Patient is oriented to person, place, and time. Mood and affect are normal.    Assessment:         ICD-10-CM ICD-9-CM   1. Cervical cancer screening  Z12.4 V76.2   2. Visit for screening mammogram  Z12.31 V76.12     Plan:   Erica was seen today for gynecologic exam.    Diagnoses and all orders for this visit:    Cervical cancer screening  -     Ambulatory referral/consult to Gynecology  -     Liquid-Based Pap Smear, Screening    Visit for screening mammogram  -     Mammo Digital Screening Bilat w/ Otoniel (XPD); Future    Pap today  MG ordered  Follow up in about 1 year (around 6/10/2026) for annual exam.

## 2025-06-19 ENCOUNTER — TELEPHONE (OUTPATIENT)
Dept: GYNECOLOGY | Facility: CLINIC | Age: 60
End: 2025-06-19
Payer: MEDICAID

## 2025-07-01 RX ORDER — LIOTHYRONINE SODIUM 5 UG/1
5 TABLET ORAL DAILY
Qty: 30 TABLET | Refills: 5 | Status: SHIPPED | OUTPATIENT
Start: 2025-07-01

## 2025-07-01 RX ORDER — LEVOTHYROXINE SODIUM 50 UG/1
50 TABLET ORAL EVERY MORNING
Qty: 30 TABLET | Refills: 5 | Status: SHIPPED | OUTPATIENT
Start: 2025-07-01

## 2025-08-28 DIAGNOSIS — L98.9 SKIN LESION: ICD-10-CM

## 2025-08-29 RX ORDER — ACYCLOVIR 800 MG/1
800 TABLET ORAL DAILY PRN
Qty: 30 TABLET | Refills: 3 | Status: SHIPPED | OUTPATIENT
Start: 2025-08-29 | End: 2026-08-29